# Patient Record
Sex: MALE | Race: WHITE | NOT HISPANIC OR LATINO | URBAN - METROPOLITAN AREA
[De-identification: names, ages, dates, MRNs, and addresses within clinical notes are randomized per-mention and may not be internally consistent; named-entity substitution may affect disease eponyms.]

---

## 2017-10-30 ENCOUNTER — OUTPATIENT (OUTPATIENT)
Dept: OUTPATIENT SERVICES | Facility: HOSPITAL | Age: 52
LOS: 1 days | End: 2017-10-30
Payer: OTHER MISCELLANEOUS

## 2017-10-30 VITALS
TEMPERATURE: 99 F | HEIGHT: 63.5 IN | HEART RATE: 67 BPM | RESPIRATION RATE: 14 BRPM | SYSTOLIC BLOOD PRESSURE: 108 MMHG | WEIGHT: 130.07 LBS | DIASTOLIC BLOOD PRESSURE: 74 MMHG

## 2017-10-30 DIAGNOSIS — M75.01 ADHESIVE CAPSULITIS OF RIGHT SHOULDER: ICD-10-CM

## 2017-10-30 DIAGNOSIS — Z90.49 ACQUIRED ABSENCE OF OTHER SPECIFIED PARTS OF DIGESTIVE TRACT: Chronic | ICD-10-CM

## 2017-10-30 DIAGNOSIS — Z98.52 VASECTOMY STATUS: Chronic | ICD-10-CM

## 2017-10-30 DIAGNOSIS — M75.00 ADHESIVE CAPSULITIS OF UNSPECIFIED SHOULDER: ICD-10-CM

## 2017-10-30 LAB
BUN SERPL-MCNC: 17 MG/DL — SIGNIFICANT CHANGE UP (ref 7–23)
CALCIUM SERPL-MCNC: 9.6 MG/DL — SIGNIFICANT CHANGE UP (ref 8.4–10.5)
CHLORIDE SERPL-SCNC: 99 MMOL/L — SIGNIFICANT CHANGE UP (ref 98–107)
CO2 SERPL-SCNC: 26 MMOL/L — SIGNIFICANT CHANGE UP (ref 22–31)
CREAT SERPL-MCNC: 0.97 MG/DL — SIGNIFICANT CHANGE UP (ref 0.5–1.3)
GLUCOSE SERPL-MCNC: 82 MG/DL — SIGNIFICANT CHANGE UP (ref 70–99)
HCT VFR BLD CALC: 42 % — SIGNIFICANT CHANGE UP (ref 39–50)
HGB BLD-MCNC: 14.1 G/DL — SIGNIFICANT CHANGE UP (ref 13–17)
MCHC RBC-ENTMCNC: 29.8 PG — SIGNIFICANT CHANGE UP (ref 27–34)
MCHC RBC-ENTMCNC: 33.6 % — SIGNIFICANT CHANGE UP (ref 32–36)
MCV RBC AUTO: 88.8 FL — SIGNIFICANT CHANGE UP (ref 80–100)
NRBC # FLD: 0 — SIGNIFICANT CHANGE UP
PLATELET # BLD AUTO: 360 K/UL — SIGNIFICANT CHANGE UP (ref 150–400)
PMV BLD: 10.1 FL — SIGNIFICANT CHANGE UP (ref 7–13)
POTASSIUM SERPL-MCNC: 3.9 MMOL/L — SIGNIFICANT CHANGE UP (ref 3.5–5.3)
POTASSIUM SERPL-SCNC: 3.9 MMOL/L — SIGNIFICANT CHANGE UP (ref 3.5–5.3)
RBC # BLD: 4.73 M/UL — SIGNIFICANT CHANGE UP (ref 4.2–5.8)
RBC # FLD: 12.3 % — SIGNIFICANT CHANGE UP (ref 10.3–14.5)
SODIUM SERPL-SCNC: 140 MMOL/L — SIGNIFICANT CHANGE UP (ref 135–145)
WBC # BLD: 7.54 K/UL — SIGNIFICANT CHANGE UP (ref 3.8–10.5)
WBC # FLD AUTO: 7.54 K/UL — SIGNIFICANT CHANGE UP (ref 3.8–10.5)

## 2017-10-30 PROCEDURE — 93010 ELECTROCARDIOGRAM REPORT: CPT

## 2017-10-30 NOTE — H&P PST ADULT - NSANTHOSAYNRD_GEN_A_CORE
No. LOUIE screening performed.  STOP BANG Legend: 0-2 = LOW Risk; 3-4 = INTERMEDIATE Risk; 5-8 = HIGH Risk

## 2017-10-30 NOTE — H&P PST ADULT - HISTORY OF PRESENT ILLNESS
50y/o male scheduled for right shoulder arthroscopic lysis of adhesions on 11/14/2017.  Pt states, "approx 1 yr ago developed pain in right shoulder, denies injury.  Three months ago pain progressed, MRI shows torn muscle."

## 2017-10-30 NOTE — H&P PST ADULT - RS GEN PE MLT RESP DETAILS PC
no rales/respirations non-labored/clear to auscultation bilaterally/no chest wall tenderness/no rhonchi/good air movement/no intercostal retractions/breath sounds equal/no wheezes

## 2017-10-30 NOTE — H&P PST ADULT - GASTROINTESTINAL DETAILS
no rebound tenderness/soft/nontender/no rigidity/no masses palpable/no bruit/no guarding/no organomegaly/no distention/bowel sounds normal

## 2017-10-30 NOTE — H&P PST ADULT - NEGATIVE GENERAL GENITOURINARY SYMPTOMS
no bladder infections/no dysuria/no urinary hesitancy/no flank pain R/no hematuria/no flank pain L/normal urinary frequency

## 2017-10-30 NOTE — H&P PST ADULT - NEGATIVE BREAST SYMPTOMS
no nipple discharge R/no breast lump L/no breast tenderness R/no breast tenderness L/no breast lump R/no nipple discharge L

## 2017-10-30 NOTE — H&P PST ADULT - PROBLEM SELECTOR PLAN 1
Pt scheduled for right shoulder arthroscopic lysis of adhesions on 11/14/2017.  labs done results pending, ekg done.  Hibiclens provided.  Preop teaching done, pt able to verbalize understanding

## 2017-10-30 NOTE — H&P PST ADULT - NEGATIVE CARDIOVASCULAR SYMPTOMS
no palpitations/no dyspnea on exertion/no orthopnea/no paroxysmal nocturnal dyspnea/no peripheral edema/no chest pain/no claudication

## 2017-10-30 NOTE — H&P PST ADULT - NEGATIVE GENERAL SYMPTOMS
no fatigue/no sweating/no anorexia/no weight gain/no polyphagia/no polyuria/no malaise/no chills/no polydipsia/no fever

## 2017-11-14 ENCOUNTER — TRANSCRIPTION ENCOUNTER (OUTPATIENT)
Age: 52
End: 2017-11-14

## 2017-11-14 ENCOUNTER — OUTPATIENT (OUTPATIENT)
Dept: OUTPATIENT SERVICES | Facility: HOSPITAL | Age: 52
LOS: 1 days | Discharge: HOME | End: 2017-11-14

## 2017-11-14 VITALS — RESPIRATION RATE: 14 BRPM | OXYGEN SATURATION: 99 % | HEART RATE: 72 BPM | TEMPERATURE: 98 F

## 2017-11-14 VITALS
SYSTOLIC BLOOD PRESSURE: 109 MMHG | HEIGHT: 63.5 IN | HEART RATE: 62 BPM | OXYGEN SATURATION: 100 % | TEMPERATURE: 98 F | DIASTOLIC BLOOD PRESSURE: 71 MMHG | WEIGHT: 130.07 LBS | RESPIRATION RATE: 16 BRPM

## 2017-11-14 DIAGNOSIS — Z98.52 VASECTOMY STATUS: Chronic | ICD-10-CM

## 2017-11-14 DIAGNOSIS — Z90.49 ACQUIRED ABSENCE OF OTHER SPECIFIED PARTS OF DIGESTIVE TRACT: Chronic | ICD-10-CM

## 2017-11-14 DIAGNOSIS — M75.01 ADHESIVE CAPSULITIS OF RIGHT SHOULDER: ICD-10-CM

## 2017-11-14 NOTE — ASU DISCHARGE PLAN (ADULT/PEDIATRIC). - BATHING
shower only/no change May shower once dressing is removed in 48 hours. May shower once dressing is removed in 48 hours. Pat dry and apply band-aids to portals.

## 2017-11-14 NOTE — ASU DISCHARGE PLAN (ADULT/PEDIATRIC). - SPECIAL INSTRUCTIONS
Follow up with Dr. Sam's office in 1 week  Remove sling tomorrow morning after block wears off  Begin full range of motion exercises. Weight bearing as tolerated

## 2017-11-14 NOTE — ASU DISCHARGE PLAN (ADULT/PEDIATRIC). - NOTIFY
Fever greater than 101/Bleeding that does not stop/Numbness, color, or temperature change to extremity/Swelling that continues Bleeding that does not stop/Numbness, color, or temperature change to extremity/Pain not relieved by Medications/Fever greater than 101/Swelling that continues

## 2017-11-14 NOTE — ASU DISCHARGE PLAN (ADULT/PEDIATRIC). - MEDICATION SUMMARY - MEDICATIONS TO TAKE
I will START or STAY ON the medications listed below when I get home from the hospital:    see medication reconciliation form  -- Indication: For Home meds    Percocet 5/325 oral tablet  -- 1 tab(s) by mouth every 6 hours, As Needed - for mild pain MDD:5 tablets  -- Caution federal law prohibits the transfer of this drug to any person other  than the person for whom it was prescribed.  May cause drowsiness.  Alcohol may intensify this effect.  Use care when operating dangerous machinery.  This prescription cannot be refilled.  This product contains acetaminophen.  Do not use  with any other product containing acetaminophen to prevent possible liver damage.  Using more of this medication than prescribed may cause serious breathing problems.    -- Indication: For Pain

## 2017-11-14 NOTE — ASU DISCHARGE PLAN (ADULT/PEDIATRIC). - ACTIVITY LEVEL
exercise/weight bearing as tolerated/elevate extremity no heavy lifting/weight bearing as tolerated/exercise/no sports/gym/elevate extremity/no tub baths

## 2017-11-14 NOTE — ASU DISCHARGE PLAN (ADULT/PEDIATRIC). - NURSING INSTRUCTIONS
Narcotic pain medication may cause nausea or constipation. Take medication with food. Increase fluids and fiber intake.   Apply ice 3x/day for 15 minutes.

## 2022-06-27 ENCOUNTER — APPOINTMENT (OUTPATIENT)
Dept: HUMAN REPRODUCTION | Facility: CLINIC | Age: 57
End: 2022-06-27

## 2025-05-09 ENCOUNTER — EMERGENCY (EMERGENCY)
Facility: HOSPITAL | Age: 60
LOS: 0 days | Discharge: HOME | End: 2025-05-09
Payer: COMMERCIAL

## 2025-05-09 VITALS
SYSTOLIC BLOOD PRESSURE: 136 MMHG | WEIGHT: 145.06 LBS | OXYGEN SATURATION: 97 % | RESPIRATION RATE: 16 BRPM | HEART RATE: 71 BPM | HEIGHT: 62 IN | TEMPERATURE: 98 F | DIASTOLIC BLOOD PRESSURE: 81 MMHG

## 2025-05-09 VITALS
HEART RATE: 67 BPM | SYSTOLIC BLOOD PRESSURE: 110 MMHG | DIASTOLIC BLOOD PRESSURE: 74 MMHG | OXYGEN SATURATION: 98 % | TEMPERATURE: 98 F | RESPIRATION RATE: 18 BRPM

## 2025-05-09 DIAGNOSIS — M25.511 PAIN IN RIGHT SHOULDER: ICD-10-CM

## 2025-05-09 DIAGNOSIS — Z87.39 PERSONAL HISTORY OF OTHER DISEASES OF THE MUSCULOSKELETAL SYSTEM AND CONNECTIVE TISSUE: ICD-10-CM

## 2025-05-09 DIAGNOSIS — M54.2 CERVICALGIA: ICD-10-CM

## 2025-05-09 DIAGNOSIS — Z90.49 ACQUIRED ABSENCE OF OTHER SPECIFIED PARTS OF DIGESTIVE TRACT: Chronic | ICD-10-CM

## 2025-05-09 DIAGNOSIS — Z98.52 VASECTOMY STATUS: Chronic | ICD-10-CM

## 2025-05-09 DIAGNOSIS — M43.6 TORTICOLLIS: ICD-10-CM

## 2025-05-09 PROCEDURE — 99284 EMERGENCY DEPT VISIT MOD MDM: CPT

## 2025-05-09 RX ORDER — KETOROLAC TROMETHAMINE 30 MG/ML
30 INJECTION, SOLUTION INTRAMUSCULAR; INTRAVENOUS ONCE
Refills: 0 | Status: DISCONTINUED | OUTPATIENT
Start: 2025-05-09 | End: 2025-05-09

## 2025-05-09 RX ORDER — LIDOCAINE HYDROCHLORIDE 20 MG/ML
1 JELLY TOPICAL ONCE
Refills: 0 | Status: COMPLETED | OUTPATIENT
Start: 2025-05-09 | End: 2025-05-09

## 2025-05-09 RX ORDER — IBUPROFEN 200 MG
1 TABLET ORAL
Qty: 30 | Refills: 0
Start: 2025-05-09

## 2025-05-09 RX ORDER — METHOCARBAMOL 500 MG/1
1500 TABLET, FILM COATED ORAL ONCE
Refills: 0 | Status: COMPLETED | OUTPATIENT
Start: 2025-05-09 | End: 2025-05-09

## 2025-05-09 RX ORDER — METHOCARBAMOL 500 MG/1
2 TABLET, FILM COATED ORAL
Qty: 30 | Refills: 0
Start: 2025-05-09 | End: 2025-05-13

## 2025-05-09 RX ORDER — LIDOCAINE HYDROCHLORIDE 20 MG/ML
1 JELLY TOPICAL
Qty: 1 | Refills: 0
Start: 2025-05-09 | End: 2025-05-13

## 2025-05-09 RX ADMIN — LIDOCAINE HYDROCHLORIDE 1 PATCH: 20 JELLY TOPICAL at 21:23

## 2025-05-09 RX ADMIN — KETOROLAC TROMETHAMINE 30 MILLIGRAM(S): 30 INJECTION, SOLUTION INTRAMUSCULAR; INTRAVENOUS at 22:05

## 2025-05-09 RX ADMIN — METHOCARBAMOL 1500 MILLIGRAM(S): 500 TABLET, FILM COATED ORAL at 21:22

## 2025-05-09 RX ADMIN — KETOROLAC TROMETHAMINE 30 MILLIGRAM(S): 30 INJECTION, SOLUTION INTRAMUSCULAR; INTRAVENOUS at 21:22

## 2025-05-09 NOTE — ED PROVIDER NOTE - NSFOLLOWUPCLINICS_GEN_ALL_ED_FT
[facility]  Orthopedic Surgery  [address], 3rd & 4th [place] [address]  Phone: [phone]  Fax:     [facility]  Orthopedic Surgery  [address] [address]  Phone: [phone]  Fax:     [facility]  Orthopedic Surgery  [address] [place] [address]  Phone: [phone]  Fax:

## 2025-05-09 NOTE — ED PROVIDER NOTE - OBJECTIVE STATEMENT
60 y/o m with PMH right shoulder adhesive capsulitis s/p repair presents to ED for c/o atraumatic right neck/ shoulder pain x 2 days. Getting worse. Denies injury trauma or fall. States started yesterday when he woke up. Thinks he may have "slept wrong". States he has been stressed and he works in a warehouse lifting boxes. Pain radiates from shoulder across scapula and lateral neck. Difficult ranging neck due to pain.

## 2025-05-09 NOTE — ED PROVIDER NOTE - NSFOLLOWUPINSTRUCTIONS_ED_ALL_ED_FT
1. TAKE ALL MEDICATIONS AS DIRECTED.  REST APPLY HEAT AS NEEDED> NO HEAVY LIFITNG. FOR PAIN YOU CAN TAKE IBUPROFEN (MOTRIN, ADVIL) OR ACETAMINOPHEN (TYLENOL) AS NEEDED, AS DIRECTED ON PACKAGING.  2. FOLLOW UP WITH ____PCP and ORTHO ______ AS DIRECTED.  YOU WERE GIVEN COPIES OF ALL LABS AND IMAGING RESULTS FROM YOUR ER VISIT--PLEASE TAKE THEM WITH YOU TO YOUR APPOINTMENT.  3. IF NEEDED, CALL [phone] TO FIND A PRIMARY CARE PHYSICIAN.  OR CALL [phone] TO MAKE AN APPOINTMENT WITH THE MEDICAL CLINIC.  4. RETURN TO THE ER FOR ANY WORSENING SYMPTOMS.

## 2025-05-09 NOTE — ED ADULT NURSE NOTE - CCCP TRG CHIEF CMPLNT
Admission Reconciliation is Completed  Discharge Reconciliation is Not Complete shoulder pain/injury Admission Reconciliation is Completed  Discharge Reconciliation is Completed

## 2025-05-09 NOTE — ED PROVIDER NOTE - PATIENT PORTAL LINK FT
You can access the FollowMyHealth Patient Portal offered by [facility] by registering at the following website: [email or web address]. By joining [employer]’s FollowMyHealth portal, you will also be able to view your health information using other applications (apps) compatible with our system.

## 2025-05-09 NOTE — ED PROVIDER NOTE - CLINICAL SUMMARY MEDICAL DECISION MAKING FREE TEXT BOX
59 y/ o M with atraumatic right neck. shoulder pain, muscle tightness noted on exam and trigger point, improved with Robaxin Lidocain and Motrin, will dc with pcp follow up

## 2025-05-09 NOTE — ED PROVIDER NOTE - PHYSICAL EXAMINATION
PE:   GEN: Awake, alert, interactive, NAD, non-toxic appearing.   HEAD: Atraumatic  EYES: Sclera white, conjunctiva pink, PERRLA  CARDIAC: Reg rate and rhythm, S1,S2, no murmur/rub/gallop. Strong central and peripheral pulses, Brisk cap refill, no evident pedal edema.   RESP: No distress noted. L/S clear = Bilat without accessory muscle use, wheeze, rhonchi, rales.   ABD: soft, supple, non-tender, no guarding. BS x 4, normoactive.   NEURO: AOx3, CN II-XII grossly intact without focal deficit.   MSK: Moving all extremities with no apparent deformities. no midline c/t/l spine TTP, tighntess noted to R paracervbical and trapezius muscles, trigger point noted to mid right scapular area  SKIN: Warm, dry, normal color, without apparent rashes.

## 2025-05-10 PROBLEM — M75.00 ADHESIVE CAPSULITIS OF UNSPECIFIED SHOULDER: Chronic | Status: ACTIVE | Noted: 2017-10-30

## 2025-06-12 ENCOUNTER — EMERGENCY (EMERGENCY)
Facility: HOSPITAL | Age: 60
LOS: 0 days | Discharge: HOME | End: 2025-06-12
Attending: STUDENT IN AN ORGANIZED HEALTH CARE EDUCATION/TRAINING PROGRAM
Payer: COMMERCIAL

## 2025-06-12 VITALS
SYSTOLIC BLOOD PRESSURE: 107 MMHG | TEMPERATURE: 97 F | OXYGEN SATURATION: 98 % | HEIGHT: 62 IN | HEART RATE: 79 BPM | RESPIRATION RATE: 20 BRPM | WEIGHT: 145.06 LBS | DIASTOLIC BLOOD PRESSURE: 75 MMHG

## 2025-06-12 VITALS
HEART RATE: 62 BPM | DIASTOLIC BLOOD PRESSURE: 64 MMHG | TEMPERATURE: 98 F | OXYGEN SATURATION: 98 % | RESPIRATION RATE: 20 BRPM | SYSTOLIC BLOOD PRESSURE: 98 MMHG
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
## 2025-06-12 DIAGNOSIS — Z90.49 ACQUIRED ABSENCE OF OTHER SPECIFIED PARTS OF DIGESTIVE TRACT: Chronic | ICD-10-CM
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
## 2025-06-12 DIAGNOSIS — M54.6 PAIN IN THORACIC SPINE: ICD-10-CM

## 2025-06-12 DIAGNOSIS — R07.89 OTHER CHEST PAIN: ICD-10-CM

## 2025-06-12 DIAGNOSIS — Z98.52 VASECTOMY STATUS: Chronic | ICD-10-CM

## 2025-06-12 DIAGNOSIS — M94.0 CHONDROCOSTAL JUNCTION SYNDROME [TIETZE]: ICD-10-CM

## 2025-06-12 DIAGNOSIS — R06.02 SHORTNESS OF BREATH: ICD-10-CM

## 2025-06-12 LAB
ALBUMIN SERPL ELPH-MCNC: 3.7 G/DL — SIGNIFICANT CHANGE UP (ref 3.3–5)
ALP SERPL-CCNC: 77 U/L — SIGNIFICANT CHANGE UP (ref 40–120)
ALT FLD-CCNC: 19 U/L — SIGNIFICANT CHANGE UP (ref 12–78)
ANION GAP SERPL CALC-SCNC: 6 MMOL/L — SIGNIFICANT CHANGE UP (ref 5–17)
AST SERPL-CCNC: 18 U/L — SIGNIFICANT CHANGE UP (ref 15–37)
BASOPHILS # BLD AUTO: 0.02 K/UL — SIGNIFICANT CHANGE UP (ref 0–0.2)
BASOPHILS NFR BLD AUTO: 0.4 % — SIGNIFICANT CHANGE UP (ref 0–2)
BILIRUB SERPL-MCNC: 0.7 MG/DL — SIGNIFICANT CHANGE UP (ref 0.2–1.2)
BUN SERPL-MCNC: 12 MG/DL — SIGNIFICANT CHANGE UP (ref 7–23)
CALCIUM SERPL-MCNC: 8.9 MG/DL — SIGNIFICANT CHANGE UP (ref 8.5–10.1)
CHLORIDE SERPL-SCNC: 107 MMOL/L — SIGNIFICANT CHANGE UP (ref 96–108)
CO2 SERPL-SCNC: 24 MMOL/L — SIGNIFICANT CHANGE UP (ref 22–31)
CREAT SERPL-MCNC: 0.94 MG/DL — SIGNIFICANT CHANGE UP (ref 0.5–1.3)
D DIMER BLD IA.RAPID-MCNC: <150 NG/ML DDU — SIGNIFICANT CHANGE UP
EGFR: 93 ML/MIN/1.73M2 — SIGNIFICANT CHANGE UP
EGFR: 93 ML/MIN/1.73M2 — SIGNIFICANT CHANGE UP
EOSINOPHIL # BLD AUTO: 0.07 K/UL — SIGNIFICANT CHANGE UP (ref 0–0.5)
EOSINOPHIL NFR BLD AUTO: 1.4 % — SIGNIFICANT CHANGE UP (ref 0–6)
GLUCOSE SERPL-MCNC: 96 MG/DL — SIGNIFICANT CHANGE UP (ref 70–99)
HCT VFR BLD CALC: 37.2 % — LOW (ref 39–50)
HGB BLD-MCNC: 12.9 G/DL — LOW (ref 13–17)
IMM GRANULOCYTES NFR BLD AUTO: 0.2 % — SIGNIFICANT CHANGE UP (ref 0–0.9)
LYMPHOCYTES # BLD AUTO: 1.53 K/UL — SIGNIFICANT CHANGE UP (ref 1–3.3)
LYMPHOCYTES # BLD AUTO: 29.7 % — SIGNIFICANT CHANGE UP (ref 13–44)
MCHC RBC-ENTMCNC: 31.2 PG — SIGNIFICANT CHANGE UP (ref 27–34)
MCHC RBC-ENTMCNC: 34.7 G/DL — SIGNIFICANT CHANGE UP (ref 32–36)
MCV RBC AUTO: 90.1 FL — SIGNIFICANT CHANGE UP (ref 80–100)
MONOCYTES # BLD AUTO: 0.37 K/UL — SIGNIFICANT CHANGE UP (ref 0–0.9)
MONOCYTES NFR BLD AUTO: 7.2 % — SIGNIFICANT CHANGE UP (ref 2–14)
NEUTROPHILS # BLD AUTO: 3.15 K/UL — SIGNIFICANT CHANGE UP (ref 1.8–7.4)
NEUTROPHILS NFR BLD AUTO: 61.1 % — SIGNIFICANT CHANGE UP (ref 43–77)
NRBC BLD AUTO-RTO: 0 /100 WBCS — SIGNIFICANT CHANGE UP (ref 0–0)
PLATELET # BLD AUTO: 284 K/UL — SIGNIFICANT CHANGE UP (ref 150–400)
POTASSIUM SERPL-MCNC: 3.6 MMOL/L — SIGNIFICANT CHANGE UP (ref 3.5–5.3)
POTASSIUM SERPL-SCNC: 3.6 MMOL/L — SIGNIFICANT CHANGE UP (ref 3.5–5.3)
PROT SERPL-MCNC: 7.7 GM/DL — SIGNIFICANT CHANGE UP (ref 6–8.3)
RBC # BLD: 4.13 M/UL — LOW (ref 4.2–5.8)
RBC # FLD: 12.4 % — SIGNIFICANT CHANGE UP (ref 10.3–14.5)
SODIUM SERPL-SCNC: 137 MMOL/L — SIGNIFICANT CHANGE UP (ref 135–145)
TROPONIN I, HIGH SENSITIVITY RESULT: 3.7 NG/L — SIGNIFICANT CHANGE UP
WBC # BLD: 5.15 K/UL — SIGNIFICANT CHANGE UP (ref 3.8–10.5)
WBC # FLD AUTO: 5.15 K/UL — SIGNIFICANT CHANGE UP (ref 3.8–10.5)

## 2025-06-12 PROCEDURE — 99285 EMERGENCY DEPT VISIT HI MDM: CPT

## 2025-06-12 PROCEDURE — 93010 ELECTROCARDIOGRAM REPORT: CPT

## 2025-06-12 PROCEDURE — 71045 X-RAY EXAM CHEST 1 VIEW: CPT | Mod: 26

## 2025-06-12 RX ORDER — ACETAMINOPHEN 500 MG/5ML
1000 LIQUID (ML) ORAL ONCE
Refills: 0 | Status: COMPLETED | OUTPATIENT
Start: 2025-06-12 | End: 2025-06-12

## 2025-06-12 RX ORDER — LIDOCAINE HYDROCHLORIDE 20 MG/ML
1 JELLY TOPICAL ONCE
Refills: 0 | Status: COMPLETED | OUTPATIENT
Start: 2025-06-12 | End: 2025-06-12

## 2025-06-12 RX ORDER — KETOROLAC TROMETHAMINE 30 MG/ML
15 INJECTION, SOLUTION INTRAMUSCULAR; INTRAVENOUS ONCE
Refills: 0 | Status: DISCONTINUED | OUTPATIENT
Start: 2025-06-12 | End: 2025-06-12

## 2025-06-12 RX ADMIN — KETOROLAC TROMETHAMINE 15 MILLIGRAM(S): 30 INJECTION, SOLUTION INTRAMUSCULAR; INTRAVENOUS at 16:48

## 2025-06-12 RX ADMIN — LIDOCAINE HYDROCHLORIDE 1 PATCH: 20 JELLY TOPICAL at 16:50

## 2025-06-12 RX ADMIN — Medication 400 MILLIGRAM(S): at 14:44

## 2025-06-12 NOTE — ED PROVIDER NOTE - CLINICAL SUMMARY MEDICAL DECISION MAKING FREE TEXT BOX
58 y/o male PMhx laminectomy, sinus surgery, presents with complaint of thoracic level left-sided back pain radiating to the left chest, chronic SOB. States chest pain is currently 5/10. Recently was lifting heavy boxes at work as a . Denies blood thinners or cardiac history.   On exam patient well appearing, VS non actionable. EKG non-ischemic , +Left chest wall tender to palpation near mid-axillary nipple line, No peripheral edema, Lungs CTAB. No midline spinal tenderness, ecchymosis, or step offs.   DDx MSK pain, costochondritis, will r/o ACS, obtain D-dimer, r/o electrolyte derangement or anemia.   Plan- CBC CMP Troponin D-Dimer, Xray Chest, EKG, ofirmev for pain.

## 2025-06-12 NOTE — ED PROVIDER NOTE - PHYSICAL EXAMINATION
Vital signs reviewed.   CONSTITUTIONAL: Well-appearing; well-nourished; in no apparent distress. Non-toxic appearing.   HEAD: Normocephalic, atraumatic.  EYES: PERRL, EOM intact, conjunctiva and sclera WNL.  NECK/LYMPH: Supple; non-tender  CARD: +Left chest wall tender to palpation near mid-axillary nipple line, RRR, Normal S1, S2; no murmurs, rubs, or gallops noted.  RESP: Normal chest excursion with respiration; breath sounds clear and equal bilaterally; no wheezes, rhonchi, or rales.  ABD/GI: soft, non-distended; non-tender  EXT/MS: moves all extremities; no midline vertebral tenderness to palpation. No peripheral edema.   SKIN: Six circular well demarcated areas of ecchymosis to the back, in regions where acupuncture cupping bulbs would be placed.   Normal for age and race; warm; dry; good turgor  NEURO: Awake, alert, oriented x 3, no gross deficits,  PSYCH: Normal mood; appropriate affect.

## 2025-06-12 NOTE — ED PROVIDER NOTE - OBJECTIVE STATEMENT
59-year-old male past medical history laminectomy 5 years ago, presents with complaint of left-sided back pain radiating to the front chest.  Patient states pain started yesterday, intermittent in nature, worst was 10/10 pain, states right now 5/10, states happens more when at rest.  Endorses mild shortness of breath which is a chronic issue for him as he has had issues with his sinuses.  States he has been having acupuncture and cupping procedures for his back muscle spasms.  Patient took Flexeril with no relief of pain. States was lifting heavy boxes at work on Monday.  patient denies cardiac history in self or family, no smoking, no blood thinners.  Denies recent travel trauma or sick contacts. Denies headache, fever, chills, numbness, tingling, abdominal pain, n/v/d, dysuria, or diaphoresis. Denies history of endocarditis or IV drug use. Denies bleeding or clotting disorders.

## 2025-06-12 NOTE — ED ADULT NURSE NOTE - OBJECTIVE STATEMENT
Pt AOx4, responsive, ambulatory. Pt c/o L sided non-radiating CP intermittent since yesterday. states monday he was doing heavy repetitive lifting. Denies sob/difficulty breathing, headache/dizziness, fever/chills, n/v/d. nkda. denies pmh. on telebox. states ibuprofen po today gave partial relief.

## 2025-06-12 NOTE — ED ADULT NURSE NOTE - NSFALLUNIVINTERV_ED_ALL_ED
Bed/Stretcher in lowest position, wheels locked, appropriate side rails in place/Call bell, personal items and telephone in reach/Instruct patient to call for assistance before getting out of bed/chair/stretcher/Non-slip footwear applied when patient is off stretcher/[place] to call system/Physically safe environment - no spills, clutter or unnecessary equipment/Purposeful proactive rounding/Room/bathroom lighting operational, light cord in reach

## 2025-06-12 NOTE — ED ADULT NURSE NOTE - NSICDXPASTMEDICALHX_GEN_ALL_CORE_FT
Goal Outcome Evaluation:              Outcome Evaluation: Vss. No complain during shift. Rested well. Call light within reach.          PAST MEDICAL HISTORY:  Adhesive capsulitis of shoulder

## 2025-06-12 NOTE — ED PROVIDER NOTE - ATTENDING APP SHARED VISIT CONTRIBUTION OF CARE
This patient was evaluated with NP fellow.  The patient's HPI, ROS, and physical exam above were noted and agreed to unless otherwise commented on below.  Nursing notes and vital signs were reviewed.     HPI: Patient is a 59-year-old male presenting to the emergency department today for chest pain.  He has no relevant past medical history and takes no medications at home.  States that yesterday he started experiencing some pain on the left side of his chest wall.  He states that the pain radiates to the front of his chest.  Describes it as a sharp pain that is worse with pressure.  It is worse also when he takes a deep inspiration.  Not tearing like sensation.  He has no headaches, lightheadedness, dizziness.  He has no fevers or chills.  No nausea or vomiting Lupe constipation or diarrhea.  No abdominal pain.  He states that he is no changes to his vision or hearing.  He states that he has no shortness of breath.  No other complaints at this time.    He states that he has had pain on the right side similar to this in the past.  He states that occurred when he injured his rib.  Believes that this may be musculoskeletal in nature, however he is concerned that there may be a cardiac component to it.  For this reason he came to the emergency department for further evaluation.    Pertinent Physical Exam:   GENERAL: The patient appears well and is in no apparent distress.    EYES: Pupils equal and reactive.  Extraocular eye movements are intact.    ENT: Head is atraumatic.  Posterior oropharynx is unremarkable.      RESPIRATORY: Lungs are clear to auscultation bilaterally.  The patient has no significant wheezing, rhonchi, or rales.    CARDIOVASCULAR: The patient has a regular rate and rhythm with no significant murmurs, rubs, or gallops.    ABDOMEN: Abdomen is soft, nondistended, and non-peritoneal.  The patient has no focal areas of tenderness.    SKIN: Skin is intact without evidence of significant lacerations or sores.    MUSCULOSKELETAL: Patient has good range of motion of all extremities.  The patient has good distal cap refill.  The patient has palpable distal pulses.  No obvious edema is noted.  Left chest wall tender to palpation    NEUROLOGIC: Cranial nerves II through XII are grossly within normal limits.  Sensory and motor examination is unremarkable.    PSYCHIATRIC: Patient is awake, alert, and oriented ×4.    MDM:

## 2025-06-12 NOTE — ED PROVIDER NOTE - PATIENT PORTAL LINK FT
You can access the FollowMyHealth Patient Portal offered by [facility] by registering at the following website: [email or web address]. By joining [employer]’s FollowMyHealth portal, you will also be able to view your health information using other applications (apps) compatible with our system.

## 2025-06-12 NOTE — ED PROVIDER NOTE - NSFOLLOWUPINSTRUCTIONS_ED_ALL_ED_FT
FOLLOW UP WITH YOUR PRIMARY CARE DOCTOR IN 1-2 DAYS REGARDING THIS VISIT.   RETURN TO ER FOR NEW OR WORSENING SYMPTOMS SUCH AS CHEST PAIN, SHORTNESS OF BREATH, ABDOMINAL PAIN, DIZZINESS, SWEATING, FEVER OR CHILLS  Take Tylenol up to 1000mg every 4-6 hours as needed for mild pain.  Take Ibuprofen 400mg, or may take max 600mg, every 6-8 hours with food as needed for moderate pain.          Musculoskeletal Pain    WHAT YOU NEED TO KNOW:    What do I need to know about musculoskeletal pain? Musculoskeletal pain can occur in muscles, bones, joints, ligaments, tendons, or nerves. The pain can be dull, achy, or sharp. You may have pain and tenderness to the touch as well. The pain can occur anywhere in your body. Musculoskeletal pain can be from an injury, surgery, or a medical condition such as polymyositis.    How is the cause of musculoskeletal pain diagnosed? Your healthcare provider will ask about past medical conditions or injuries. Your provider may touch, press, bend, stretch, or move the painful area. Tell your provider when the pain started and if the pain is constant or comes and goes. Tell your provider if the pain is dull, sharp, or achy. Also tell your provider if the pain wakes you from sleep. You may also need any of the following tests:    X-ray, MRI, or CT scan pictures may show an injury or health condition that is causing your pain. Contrast liquid may be given to help the area show up better in the pictures. Tell the healthcare provider if you have ever had an allergic reaction to contrast liquid. Do not enter the MRI room with anything metal. Metal can cause serious injury. Tell the provider if you have any metal in or on your body.    Ultrasound pictures may show problems in your muscles or tissues that are causing your pain.  How is musculoskeletal pain treated?    NSAIDs help decrease swelling and pain or fever. This medicine is available with or without a doctor's order. NSAIDs can cause stomach bleeding or kidney problems in certain people. If you take blood thinner medicine, always ask your healthcare provider if NSAIDs are safe for you. Always read the medicine label and follow directions.    Acetaminophen decreases pain and fever. It is available without a doctor's order. Ask how much to take and how often to take it. Follow directions. Read the labels of all other medicines you are using to see if they also contain acetaminophen, or ask your doctor or pharmacist. Acetaminophen can cause liver damage if not taken correctly.    Muscle relaxers help relax your muscles to decrease pain and muscle spasms.    Steroids may be given to decrease redness, pain, and swelling.    A pain cream, gel, or patch may be applied to your skin on painful areas.  What can I do to manage my symptoms?    Rest as directed. Avoid activity that causes pain. You may be able to return to normal activity when you can move without pain. Follow directions for rest and activity.    Ice the painful area to decrease pain and swelling. Use an ice pack, or put ice in a plastic bag and cover it with a towel. Always put a cloth between the ice and your skin. Apply the ice as often as directed for the first 24 to 48 hours.    Apply heat to the area as directed. Heat helps decrease pain and muscle spasms. Your healthcare provider will tell you when and how often to apply heat.    Apply compression to the area, if directed. Your provider may want you to use a splint, brace, or elastic bandage. Compression helps decrease pain and swelling. A splint, brace, or bandage will also help protect the painful area when you move around.  How to Wrap an Elastic Bandage      Elevate (raise) the painful area to reduce swelling and pain. Use pillows, blankets, or rolled towels to elevate the area above the level of your heart. Elevate the area as often as you can.    Elevate Leg      Ask your provider if alternative therapies are right for you. Examples include acupuncture, relaxation, and massage. These therapies may help reduce pain.  When should I seek immediate care?    You have severe pain when you move the area.    You lose feeling in the area.    You have new or worse pain or swelling in the area. Your skin may feel tight.  When should I call my doctor?    You have a fever.    You have pain that does not get better with treatment.    You have trouble sleeping because of your pain.    Your painful area becomes more tender, red, and warm to the touch.    You have less movement of the painful area.    You have questions or concerns about your condition or care. FOLLOW UP WITH YOUR PRIMARY CARE DOCTOR IN 1-2 DAYS REGARDING THIS VISIT.   RETURN TO ER FOR NEW OR WORSENING SYMPTOMS SUCH AS CHEST PAIN, SHORTNESS OF BREATH, ABDOMINAL PAIN, DIZZINESS, SWEATING, FEVER OR CHILLS  Take Tylenol up to 1000mg every 4-6 hours as needed for mild pain.  Take Ibuprofen 400mg, or may take max 600mg, every 6-8 hours with food as needed for moderate pain.      Costochondritis    AMBULATORY CARE:    Costochondritis is a condition that causes pain in the cartilage that connects your ribs to your sternum (breastbone). Cartilage is the tough, bendable tissue that protects your bones.    Common symptoms include the following:    Sharp, dull, or aching pain that may come and go    Worsening pain over time or when you move, breathe deeply, or push or lift an object    Pain that spreads to your back, abdomen, or down your arm    Pain when you touch your chest    Trouble sleeping or doing your usual activities because of pain  Call your doctor if:    You have a fever.    The painful areas of your chest look swollen, red, and feel warm to the touch.    You cannot sleep because of the pain.    You have questions or concerns about your condition or care.  Treatment for costochondritis may not be needed. Costochondritis pain may go away without treatment, usually within a year. Treatment may include any of the following:    Acetaminophen decreases pain and fever. It is available without a doctor's order. Ask how much to take and how often to take it. Follow directions. Read the labels of all other medicines you are using to see if they also contain acetaminophen, or ask your doctor or pharmacist. Acetaminophen can cause liver damage if not taken correctly.    NSAIDs, such as ibuprofen, help decrease swelling, pain, and fever. This medicine is available with or without a doctor's order. NSAIDs can cause stomach bleeding or kidney problems in certain people. If you take blood thinner medicine, always ask if NSAIDs are safe for you. Always read the medicine label and follow directions. Do not give these medicines to children younger than 6 months without direction from a healthcare provider.  Manage your symptoms:    Avoid painful movements and activities. Do not carry objects, such as a purse or backpack, if this causes pain. Avoid activities such as weightlifting until your pain decreases or goes away. Ask your provider which activities are best for you to do while you recover.    Apply heat to decrease pain. Use a heat pack or a heating pad on a low setting. Apply heat on the area for 20 to 30 minutes every 2 hours for as many days as directed.    Apply ice to decrease pain and swelling. Use an ice pack, or put crushed ice in a plastic bag. Cover the bag with a towel before you apply it to the painful area. Apply ice for 15 to 20 minutes every hour or as directed.    Do gentle stretching exercises as directed. Gentle stretching may help your symptoms.  a doorway and put your hands on the door frame at the level of your ears or shoulders. Take 1 step forward and gently stretch your chest. Try this with your hands higher up on the doorway.

## 2025-06-15 ENCOUNTER — EMERGENCY (EMERGENCY)
Facility: HOSPITAL | Age: 60
LOS: 0 days | Discharge: HOME | End: 2025-06-15
Attending: STUDENT IN AN ORGANIZED HEALTH CARE EDUCATION/TRAINING PROGRAM
Payer: COMMERCIAL

## 2025-06-15 VITALS
HEART RATE: 68 BPM | DIASTOLIC BLOOD PRESSURE: 88 MMHG | OXYGEN SATURATION: 99 % | TEMPERATURE: 97 F | RESPIRATION RATE: 19 BRPM | SYSTOLIC BLOOD PRESSURE: 134 MMHG

## 2025-06-15 VITALS
TEMPERATURE: 98 F | SYSTOLIC BLOOD PRESSURE: 123 MMHG | WEIGHT: 145.06 LBS | HEIGHT: 62 IN | HEART RATE: 88 BPM | RESPIRATION RATE: 17 BRPM | OXYGEN SATURATION: 99 % | DIASTOLIC BLOOD PRESSURE: 79 MMHG

## 2025-06-15 DIAGNOSIS — Z98.52 VASECTOMY STATUS: Chronic | ICD-10-CM

## 2025-06-15 DIAGNOSIS — R07.9 CHEST PAIN, UNSPECIFIED: ICD-10-CM

## 2025-06-15 DIAGNOSIS — Z90.49 ACQUIRED ABSENCE OF OTHER SPECIFIED PARTS OF DIGESTIVE TRACT: Chronic | ICD-10-CM

## 2025-06-15 DIAGNOSIS — K21.9 GASTRO-ESOPHAGEAL REFLUX DISEASE WITHOUT ESOPHAGITIS: ICD-10-CM

## 2025-06-15 PROCEDURE — 93010 ELECTROCARDIOGRAM REPORT: CPT

## 2025-06-15 PROCEDURE — 99284 EMERGENCY DEPT VISIT MOD MDM: CPT

## 2025-06-15 RX ADMIN — Medication 1000 MILLIGRAM(S): at 01:25
